# Patient Record
Sex: MALE | URBAN - METROPOLITAN AREA
[De-identification: names, ages, dates, MRNs, and addresses within clinical notes are randomized per-mention and may not be internally consistent; named-entity substitution may affect disease eponyms.]

---

## 2018-06-07 PROBLEM — 275978004 SCREENING FOR MALIGNANT NEOPLASM OF COLON: Status: INACTIVE | Noted: 2018-06-07

## 2018-06-07 PROBLEM — 238131007 OVERWEIGHT: Status: ACTIVE | Noted: 2018-06-07

## 2018-06-07 PROBLEM — 266435005 GASTRO-ESOPHAGEAL REFLUX DISEASE WITHOUT ESOPHAGITIS: Status: ACTIVE | Noted: 2018-06-07

## 2018-06-07 PROBLEM — 55822004 HYPERLIPIDEMIA: Status: ACTIVE | Noted: 2018-06-07

## 2018-06-07 PROBLEM — 59621000 ESSENTIAL HYPERTENSION: Status: ACTIVE | Noted: 2018-06-07

## 2021-03-19 ENCOUNTER — OFFICE VISIT (OUTPATIENT)
Dept: URBAN - METROPOLITAN AREA CLINIC 27 | Facility: CLINIC | Age: 66
End: 2021-03-19

## 2021-03-19 PROBLEM — 14760008 CONSTIPATION: Status: ACTIVE | Noted: 2021-03-19

## 2021-03-19 PROBLEM — 271832001 FLATULENCE, ERUCTATION AND GAS PAIN: Status: ACTIVE | Noted: 2021-03-19

## 2021-03-19 PROBLEM — 428283002 HISTORY OF POLYP OF COLON (SITUATION): Status: ACTIVE | Noted: 2021-03-19

## 2021-03-19 PROBLEM — 87522002 IRON DEFICIENCY ANEMIA: Status: ACTIVE | Noted: 2021-03-19

## 2021-04-23 ENCOUNTER — OFFICE VISIT (OUTPATIENT)
Dept: URBAN - METROPOLITAN AREA SURGERY CENTER 7 | Facility: SURGERY CENTER | Age: 66
End: 2021-04-23

## 2022-04-30 ENCOUNTER — TELEPHONE ENCOUNTER (OUTPATIENT)
Dept: URBAN - METROPOLITAN AREA CLINIC 121 | Facility: CLINIC | Age: 67
End: 2022-04-30

## 2022-04-30 RX ORDER — PRAVASTATIN SODIUM 40 MG/1
TABLET ORAL
OUTPATIENT
Start: 2018-06-07

## 2022-04-30 RX ORDER — SODIUM SULFATE, POTASSIUM SULFATE, MAGNESIUM SULFATE 17.5; 3.13; 1.6 G/ML; G/ML; G/ML
MIX AND USE AS DIRECTED SOLUTION, CONCENTRATE ORAL
OUTPATIENT
Start: 2018-06-08 | End: 2018-07-06

## 2022-04-30 RX ORDER — PRAVASTATIN SODIUM 40 MG/1
TABLET ORAL
OUTPATIENT
Start: 2018-06-07 | End: 2021-03-19

## 2022-04-30 RX ORDER — SODIUM SULFATE, POTASSIUM SULFATE, MAGNESIUM SULFATE 17.5; 3.13; 1.6 G/ML; G/ML; G/ML
MIX AND USE AS DIRECTED SOLUTION, CONCENTRATE ORAL
OUTPATIENT
Start: 2018-06-08

## 2022-05-01 ENCOUNTER — TELEPHONE ENCOUNTER (OUTPATIENT)
Dept: URBAN - METROPOLITAN AREA CLINIC 121 | Facility: CLINIC | Age: 67
End: 2022-05-01

## 2022-05-01 RX ORDER — MULTIVIT-MIN/IRON/FOLIC ACID/K 18-600-40
CAPSULE ORAL
Status: ACTIVE | COMMUNITY
Start: 2021-03-19

## 2022-05-01 RX ORDER — CLOMIPHENE CITRATE 50 MG/1
TABLET ORAL
Status: ACTIVE | COMMUNITY
Start: 2021-03-19

## 2022-05-01 RX ORDER — KRILL/OM-3/DHA/EPA/PHOSPHO/AST 1000-230MG
CAPSULE ORAL
Status: ACTIVE | COMMUNITY
Start: 2018-06-07

## 2022-05-01 RX ORDER — LOSARTAN POTASSIUM AND HYDROCHLOROTHIAZIDE 12.5; 1 MG/1; MG/1
TABLET, FILM COATED ORAL
Status: ACTIVE | COMMUNITY
Start: 2018-06-07

## 2022-05-01 RX ORDER — ATORVASTATIN CALCIUM 10 MG/1
TABLET, FILM COATED ORAL
Status: ACTIVE | COMMUNITY
Start: 2021-03-19

## 2022-10-20 ENCOUNTER — OFFICE VISIT (OUTPATIENT)
Dept: URBAN - METROPOLITAN AREA CLINIC 27 | Facility: CLINIC | Age: 67
End: 2022-10-20
Payer: COMMERCIAL

## 2022-10-20 ENCOUNTER — LAB OUTSIDE AN ENCOUNTER (OUTPATIENT)
Dept: URBAN - METROPOLITAN AREA CLINIC 27 | Facility: CLINIC | Age: 67
End: 2022-10-20

## 2022-10-20 VITALS
HEART RATE: 69 BPM | TEMPERATURE: 97.1 F | BODY MASS INDEX: 25.92 KG/M2 | RESPIRATION RATE: 17 BRPM | SYSTOLIC BLOOD PRESSURE: 115 MMHG | DIASTOLIC BLOOD PRESSURE: 81 MMHG | WEIGHT: 175 LBS | HEIGHT: 69 IN

## 2022-10-20 DIAGNOSIS — K59.09 CONSTIPATION: ICD-10-CM

## 2022-10-20 DIAGNOSIS — R14.2 ERUCTATION: ICD-10-CM

## 2022-10-20 DIAGNOSIS — I10 HYPERTENSION: ICD-10-CM

## 2022-10-20 DIAGNOSIS — E73.8 OTHER LACTOSE INTOLERANCE: ICD-10-CM

## 2022-10-20 DIAGNOSIS — R11.0 NAUSEA: ICD-10-CM

## 2022-10-20 DIAGNOSIS — Z79.82 LONG TERM (CURRENT) USE OF ASPIRIN: ICD-10-CM

## 2022-10-20 DIAGNOSIS — K21.9 GERD: ICD-10-CM

## 2022-10-20 PROBLEM — 38341003 HYPERTENSION: Status: ACTIVE | Noted: 2022-10-20

## 2022-10-20 PROBLEM — 449681000124101 LONG-TERM CURRENT USE OF ANTIPLATELET DRUG: Status: ACTIVE | Noted: 2018-06-07

## 2022-10-20 PROBLEM — 782415009 INTOLERANCE TO LACTOSE (FINDING): Status: ACTIVE | Noted: 2022-10-20

## 2022-10-20 PROBLEM — 235595009 GASTROESOPHAGEAL REFLUX DISEASE: Status: ACTIVE | Noted: 2022-10-20

## 2022-10-20 PROCEDURE — 99204 OFFICE O/P NEW MOD 45 MIN: CPT | Performed by: INTERNAL MEDICINE

## 2022-10-20 RX ORDER — ATORVASTATIN CALCIUM 10 MG/1
TABLET, FILM COATED ORAL
Status: ACTIVE | COMMUNITY
Start: 2021-03-19

## 2022-10-20 RX ORDER — KRILL/OM-3/DHA/EPA/PHOSPHO/AST 1000-230MG
CAPSULE ORAL
Status: ACTIVE | COMMUNITY
Start: 2018-06-07

## 2022-10-20 RX ORDER — LOSARTAN POTASSIUM AND HYDROCHLOROTHIAZIDE 12.5; 1 MG/1; MG/1
TABLET, FILM COATED ORAL
Status: ACTIVE | COMMUNITY
Start: 2018-06-07

## 2022-10-20 NOTE — HPI-TODAY'S VISIT:
Patient here with complaints of nausea that has been present for the past month. It occurs on a daily basis, but it is sporadic and occurs throughout the day without specific trigger. It occurs at least 2 to 3 times per day. He does not have any vomiting. It does not occur more frequently before or after a meal. It typically lasts 10 minutes. He does not take anything for this. He also has intermittent eructation which seems to occur soon after eating. PRN [place] chewables are helpful. He is lactose intolerant. He has not tried Lactaid supplementation. He denies any reflux symptoms with OTC Prilosec once daily. He is adherent to an anti-reflux regimen. His chronic constipation is well-controlled with daily MiraLAX and adequate water fiber intake. He does not have any rectal bleeding. He has intentionally lost 15 pounds since January with [employer]. He had similar symptoms of nausea 10 years ago, and was found to have cholelithiasis. He underwent cholecystectomy with resolution of his nausea (until recently). He underwent colonoscopy in April 2021; no polyps. He has a history of adenomatous polyps in 2018. He has not had a prior upper endoscopy. There is no family history of colon cancer or polyps.

## 2022-10-21 ENCOUNTER — DASHBOARD ENCOUNTERS (OUTPATIENT)
Age: 67
End: 2022-10-21

## 2022-10-21 ENCOUNTER — LAB OUTSIDE AN ENCOUNTER (OUTPATIENT)
Dept: URBAN - METROPOLITAN AREA CLINIC 27 | Facility: CLINIC | Age: 67
End: 2022-10-21

## 2022-10-22 LAB
A/G RATIO: 1.7
ABSOLUTE BASOPHILS: 29
ABSOLUTE EOSINOPHILS: 154
ABSOLUTE LYMPHOCYTES: 1344
ABSOLUTE MONOCYTES: 403
ABSOLUTE NEUTROPHILS: 2870
ALBUMIN: 4.3
ALKALINE PHOSPHATASE: 64
ALT (SGPT): 23
AMYLASE: 48
AST (SGOT): 21
BASOPHILS: 0.6
BILIRUBIN, TOTAL: 1.6
BUN/CREATININE RATIO: (no result)
BUN: 16
CALCIUM: 9.5
CARBON DIOXIDE, TOTAL: 31
CHLORIDE: 101
CREATININE: 0.74
EGFR: 101
EOSINOPHILS: 3.2
GLOBULIN, TOTAL: 2.5
GLUCOSE: 98
H PYLORI BREATH TEST: NOT DETECTED
H. PYLORI BREATH TEST: NOT DETECTED
HEMATOCRIT: 44.7
HEMOGLOBIN: 15.2
INTERPRETATION: NOT DETECTED
LIPASE: 21
LYMPHOCYTES: 28
MCH: 30
MCHC: 34
MCV: 88.2
MONOCYTES: 8.4
MPV: 9.8
NEUTROPHILS: 59.8
PLATELET COUNT: 314
POTASSIUM: 3.9
PROTEIN, TOTAL: 6.8
RDW: 13.4
RED BLOOD CELL COUNT: 5.07
SODIUM: 138
WHITE BLOOD CELL COUNT: 4.8

## 2022-12-22 ENCOUNTER — CLAIMS CREATED FROM THE CLAIM WINDOW (OUTPATIENT)
Dept: URBAN - METROPOLITAN AREA SURGERY CENTER 7 | Facility: SURGERY CENTER | Age: 67
End: 2022-12-22
Payer: COMMERCIAL

## 2022-12-22 ENCOUNTER — TELEPHONE ENCOUNTER (OUTPATIENT)
Dept: URBAN - METROPOLITAN AREA CLINIC 27 | Facility: CLINIC | Age: 67
End: 2022-12-22

## 2022-12-22 ENCOUNTER — OFFICE VISIT (OUTPATIENT)
Dept: URBAN - METROPOLITAN AREA SURGERY CENTER 7 | Facility: SURGERY CENTER | Age: 67
End: 2022-12-22

## 2022-12-22 DIAGNOSIS — K31.7 BENIGN GASTRIC POLYP: ICD-10-CM

## 2022-12-22 DIAGNOSIS — R11.0 AM NAUSEA: ICD-10-CM

## 2022-12-22 DIAGNOSIS — K31.89 ACQUIRED DEFORMITY OF DUODENUM: ICD-10-CM

## 2022-12-22 DIAGNOSIS — R12 BURNING REFLUX: ICD-10-CM

## 2022-12-22 PROCEDURE — G8907 PT DOC NO EVENTS ON DISCHARG: HCPCS | Performed by: INTERNAL MEDICINE

## 2022-12-22 PROCEDURE — 43239 EGD BIOPSY SINGLE/MULTIPLE: CPT | Performed by: INTERNAL MEDICINE

## 2022-12-22 RX ORDER — KRILL/OM-3/DHA/EPA/PHOSPHO/AST 1000-230MG
CAPSULE ORAL
Status: ACTIVE | COMMUNITY
Start: 2018-06-07

## 2022-12-22 RX ORDER — ONDANSETRON HYDROCHLORIDE 8 MG/1
TAKE 1/2 OR 1 TABLET TABLET, FILM COATED ORAL
Qty: 60 | Refills: 2 | OUTPATIENT
Start: 2022-12-22

## 2022-12-22 RX ORDER — ATORVASTATIN CALCIUM 10 MG/1
TABLET, FILM COATED ORAL
Status: ACTIVE | COMMUNITY
Start: 2021-03-19

## 2022-12-22 RX ORDER — LOSARTAN POTASSIUM AND HYDROCHLOROTHIAZIDE 12.5; 1 MG/1; MG/1
TABLET, FILM COATED ORAL
Status: ACTIVE | COMMUNITY
Start: 2018-06-07